# Patient Record
Sex: FEMALE | Race: ASIAN | NOT HISPANIC OR LATINO | URBAN - METROPOLITAN AREA
[De-identification: names, ages, dates, MRNs, and addresses within clinical notes are randomized per-mention and may not be internally consistent; named-entity substitution may affect disease eponyms.]

---

## 2019-09-23 ENCOUNTER — EMERGENCY (EMERGENCY)
Facility: HOSPITAL | Age: 39
LOS: 1 days | Discharge: HOME | End: 2019-09-23
Attending: EMERGENCY MEDICINE
Payer: MEDICAID

## 2019-09-23 VITALS
DIASTOLIC BLOOD PRESSURE: 95 MMHG | HEART RATE: 86 BPM | HEIGHT: 64 IN | WEIGHT: 132.28 LBS | TEMPERATURE: 98 F | OXYGEN SATURATION: 98 % | RESPIRATION RATE: 17 BRPM | SYSTOLIC BLOOD PRESSURE: 136 MMHG

## 2019-09-23 PROCEDURE — 99283 EMERGENCY DEPT VISIT LOW MDM: CPT

## 2019-09-24 VITALS
DIASTOLIC BLOOD PRESSURE: 80 MMHG | HEART RATE: 76 BPM | OXYGEN SATURATION: 99 % | SYSTOLIC BLOOD PRESSURE: 108 MMHG | TEMPERATURE: 98 F | RESPIRATION RATE: 16 BRPM

## 2019-09-24 DIAGNOSIS — Z90.09 ACQUIRED ABSENCE OF OTHER PART OF HEAD AND NECK: Chronic | ICD-10-CM

## 2019-09-24 PROCEDURE — 99284 EMERGENCY DEPT VISIT MOD MDM: CPT

## 2019-09-24 RX ORDER — DIPHENHYDRAMINE HCL 50 MG
50 CAPSULE ORAL ONCE
Refills: 0 | Status: COMPLETED | OUTPATIENT
Start: 2019-09-24 | End: 2019-09-24

## 2019-09-24 RX ORDER — FAMOTIDINE 10 MG/ML
20 INJECTION INTRAVENOUS ONCE
Refills: 0 | Status: COMPLETED | OUTPATIENT
Start: 2019-09-24 | End: 2019-09-24

## 2019-09-24 RX ADMIN — Medication 50 MILLIGRAM(S): at 00:40

## 2019-09-24 RX ADMIN — FAMOTIDINE 20 MILLIGRAM(S): 10 INJECTION INTRAVENOUS at 00:57

## 2019-09-24 RX ADMIN — Medication 60 MILLIGRAM(S): at 00:58

## 2019-09-24 NOTE — ED ADULT NURSE NOTE - OBJECTIVE STATEMENT
pt co allergic rxn x1d. pt states she took benadryl 24hrs ago. pt with hives all over body, no angioedema, no wheezing no sob aox4 skin intact neuro intact no fever no chills

## 2019-09-24 NOTE — ED PROVIDER NOTE - PROGRESS NOTE DETAILS
Pt feels better, decreased erythema and itching. Will Dc w Rx for steroid, benadryl PRN, derm FU. Pt  will drive home.

## 2019-09-24 NOTE — ED PROVIDER NOTE - PATIENT PORTAL LINK FT
You can access the FollowMyHealth Patient Portal offered by [facility] by registering at the following website: [email or web address]. By joining [email or web address]’s FollowMyHealth portal, you will also be able to view your health information using other applications (apps) compatible with our system.

## 2019-09-24 NOTE — ED ADULT NURSE REASSESSMENT NOTE - NS ED NURSE REASSESS COMMENT FT1
pt states feeling better, redness itchiness improved, pt given dc instruction verbalized understanding, pt will be picked up by , ambulating without assist

## 2019-09-24 NOTE — ED ADULT NURSE NOTE - NSIMPLEMENTINTERV_GEN_ALL_ED
Implemented All Universal Safety Interventions:  [place] to call system. Call bell, personal items and telephone within reach. Instruct patient to call for assistance. Room bathroom lighting operational. Non-slip footwear when patient is off stretcher. Physically safe environment: no spills, clutter or unnecessary equipment. Stretcher in lowest position, wheels locked, appropriate side rails in place.

## 2019-09-24 NOTE — ED PROVIDER NOTE - OBJECTIVE STATEMENT
38y F no signif PMH here with diffuse red, raised, pruritic lesions to trunk, extremities. Began last night. Some relief with benadryl 25mg which she took last night. No known exposures. No new foods, meds, lotions, soaps, etc. No travel. No insect bites. No trouble breathing, swallowing.

## 2019-09-24 NOTE — ED PROVIDER NOTE - NSFOLLOWUPCLINICS_GEN_ALL_ED_FT
[facility] - [place]  [facility]  [address], [address]  [place] [address]  Phone: [phone]  Fax: [phone]  Follow Up Time: 1-3 Days

## 2019-09-24 NOTE — ED PROVIDER NOTE - CLINICAL SUMMARY MEDICAL DECISION MAKING FREE TEXT BOX
38y F here with diffuse urticarial lesions for past 24 hours. No known exposures. No si or sx of anaphylaxis. Will treat sx and re-eval. OP Derm FU.

## 2019-09-24 NOTE — ED PROVIDER NOTE - PHYSICAL EXAMINATION
Gen: WNWD NAD  HEENT: NCAT EOMI normal pharynx no oropharyngeal edema  Neck: supple  CV: RRR, no murmur  Lung: CTA BL no wrr  Abd: +BS soft NTND  Ext: wwp, palp pulses, FROMx4, no cce  Skin: diffuse urticarial lesions to trunk, extremities sparing mucous membranes, palms and soles   Neuro: CN grossly intact, sensation intact, motor 5/5 throughout

## 2022-03-23 ENCOUNTER — EMERGENCY (EMERGENCY)
Facility: HOSPITAL | Age: 42
LOS: 1 days | Discharge: HOME | End: 2022-03-23
Attending: EMERGENCY MEDICINE
Payer: COMMERCIAL

## 2022-03-23 VITALS
RESPIRATION RATE: 19 BRPM | OXYGEN SATURATION: 97 % | DIASTOLIC BLOOD PRESSURE: 85 MMHG | TEMPERATURE: 98 F | HEART RATE: 92 BPM | SYSTOLIC BLOOD PRESSURE: 123 MMHG | HEIGHT: 64 IN | WEIGHT: 139.99 LBS

## 2022-03-23 VITALS
SYSTOLIC BLOOD PRESSURE: 111 MMHG | DIASTOLIC BLOOD PRESSURE: 73 MMHG | TEMPERATURE: 98 F | HEART RATE: 89 BPM | RESPIRATION RATE: 18 BRPM | OXYGEN SATURATION: 98 %

## 2022-03-23 DIAGNOSIS — Z90.09 ACQUIRED ABSENCE OF OTHER PART OF HEAD AND NECK: Chronic | ICD-10-CM

## 2022-03-23 PROBLEM — E07.9 DISORDER OF THYROID, UNSPECIFIED: Chronic | Status: ACTIVE | Noted: 2019-09-24

## 2022-03-23 PROCEDURE — 99283 EMERGENCY DEPT VISIT LOW MDM: CPT

## 2022-03-23 PROCEDURE — 99282 EMERGENCY DEPT VISIT SF MDM: CPT

## 2022-03-23 NOTE — ED PROVIDER NOTE - NS ED ROS FT
GENERAL: No fever, chills  CARDIAC: no chest pain/pressure, SOB, lower extremity swelling  PULMONARY: + cough, no SOB  GI: no abdominal pain, n/v/d  : no dysuria, no hematuria  SKIN: no rashes, no ecchymosis  NEURO: no headache, lightheadedness  MSK: No joint pain, myalgia, weakness.

## 2022-03-23 NOTE — ED ADULT NURSE NOTE - OBJECTIVE STATEMENT
41y f pt c/o of cough and not feeling well; unable to sleep; pt states took cough syrup without much relief; aox3; no resp distress; + cough with some production; pt denies fever; + chills; no dysuria/hematuria; no blood in stool

## 2022-03-23 NOTE — ED PROVIDER NOTE - CLINICAL SUMMARY MEDICAL DECISION MAKING FREE TEXT BOX
Bry Ayala DO PGY-1: 41yF with PMH of hypothyroid presents to the ED c/o 3 months of intermittent cough that is worse at night when she lays down to sleep with b/l lateral rib pain that is worsened by coughing since having COVID in Dec 2021.. Pt has tried prescription cough medicine without improvement. Denies fever, CP, SOB, abdominal pain, n/v/d, dysuria. Nonfocal exam. Likely post-nasal drip, residual lung injury from COVID, allergies. Will dc the pt with ENT f/u and have pt take OTC medications. Bry Ayala DO PGY-1: 41yF with PMH of hypothyroid presents to the ED c/o 3 months of intermittent cough that is worse at night when she lays down to sleep with b/l lateral rib pain that is worsened by coughing since having COVID in Dec 2021.. Pt has tried prescription cough medicine without improvement. Denies fever, CP, SOB, abdominal pain, n/v/d, dysuria. Nonfocal exam. Likely post-nasal drip, residual lung injury from COVID, allergies. Will dc the pt with ENT f/u and have pt take OTC medications.     Attn - pt with nasal congestion and cough since COVID 3 months ago and c/o rib pain. not localized. URI poss Post COVID, f/u ENT, Sudafed. NSAIDS.

## 2022-03-23 NOTE — ED PROVIDER NOTE - NSFOLLOWUPINSTRUCTIONS_ED_ALL_ED_FT
1. Please follow-up with ENT doctor. Call to make an appointment.    2. Please  cold medicine at the pharmacy.    3. Please follow-up with your primary care doctor within a week.    4. Please return to the ER if you develop worsening cough. shortness or breath, start coughing up blood, chest pain or anything of concern.

## 2022-03-23 NOTE — ED PROVIDER NOTE - PHYSICAL EXAMINATION
GEN: Patient awake alert NAD.   HEENT: normocephalic, atraumatic, moist MM  CARDIAC: RRR, S1, S2, no murmur.   PULM: CTA B/L no wheeze, rhonchi, rales.   ABD: soft NT, ND, no rebound no guarding, no CVA tenderness. GEN: Patient awake alert NAD.   HEENT: normocephalic, atraumatic, moist MM  CARDIAC: RRR, S1, S2, no murmur.   PULM: CTA B/L no wheeze, rhonchi, rales.   ABD: soft NT, ND, no rebound no guarding, no CVA tenderness.    Attn - alert, nad, nose - congestion, OP- NL, no stridor, lungs - clear, no W/R/R, Chest - tender generalized R ant/lat ribs. abdo - soft, NT, ND, no HSM, no CVAT.

## 2022-03-23 NOTE — ED PROVIDER NOTE - PATIENT PORTAL LINK FT
You can access the FollowMyHealth Patient Portal offered by [facility] by registering at the following website: [email or web address]. By joining [employer]’s FollowMyHealth portal, you will also be able to view your health information using other applications (apps) compatible with our system.

## 2022-03-23 NOTE — ED PROVIDER NOTE - OBJECTIVE STATEMENT
41yF with PMH of hypothyroid presents to the ED c/o 3 months of intermittent cough that is worse at night when she lays down to sleep. 41yF with PMH of hypothyroid presents to the ED c/o 3 months of intermittent cough that is worse at night when she lays down to sleep with b/l lateral rib pain that is worsened by coughing since having COVID in Dec 2021. Pt has tried prescription cough medicine without improvement. Denies fever, CP, SOB, abdominal pain, n/v/d, dysuria. 41yF with PMH of hypothyroid presents to the ED c/o 3 months of intermittent cough that is worse at night when she lays down to sleep with b/l lateral rib pain that is worsened by coughing since having COVID in Dec 2021. Pt has tried prescription cough medicine without improvement. Denies fever, CP, SOB, abdominal pain, n/v/d, dysuria.     Attn - pt seen in 31L - agree with above - pt c/o post nasal drip with productive cough since COVID 3 months ago and c/o pain R anterior/lateral ribs.  not localized.  no sob, itching, fever.  pain in chest worse with mvm.

## 2022-03-23 NOTE — ED ADULT TRIAGE NOTE - CHIEF COMPLAINT QUOTE
bilateral rib pain from cough that has never resolved since having covid 12/2021. Pt denies fevers or new symptoms.

## 2022-03-23 NOTE — ED PROVIDER NOTE - NSFOLLOWUPCLINICS_GEN_ALL_ED_FT
[facility] - ENT  Otolaryngology (ENT)  [address] [place] [address]  Phone: [phone]  Fax:   Follow Up Time: 7-10 Days

## 2023-10-04 NOTE — ED PROVIDER NOTE - SKIN [+], MLM
Problem: Nutrition/Hydration-ADULT  Goal: Nutrient/Hydration intake appropriate for improving, restoring or maintaining nutritional needs  Description: Monitor and assess patient's nutrition/hydration status for malnutrition. Collaborate with interdisciplinary team and initiate plan and interventions as ordered. Monitor patient's weight and dietary intake as ordered or per policy. Utilize nutrition screening tool and intervene as necessary. Determine patient's food preferences and provide high-protein, high-caloric foods as appropriate.      INTERVENTIONS:  - Monitor oral intake, urinary output, labs, and treatment plans  - Assess nutrition and hydration status and recommend course of action  - Evaluate amount of meals eaten  - Assist patient with eating if necessary   - Allow adequate time for meals  - Recommend/ encourage appropriate diets, oral nutritional supplements, and vitamin/mineral supplements  - Order, calculate, and assess calorie counts as needed  - Recommend, monitor, and adjust tube feedings and TPN/PPN based on assessed needs  - Assess need for intravenous fluids  - Provide specific nutrition/hydration education as appropriate  - Include patient/family/caregiver in decisions related to nutrition  Outcome: Progressing ITCH/RASH

## 2024-05-07 NOTE — ED ADULT NURSE NOTE - NS ED NURSE DISCH DISPOSITION
Detail Level: Zone Initiate Treatment: Tac bid x 2 weeks prn Render In Strict Bullet Format?: No Discharged